# Patient Record
Sex: MALE | Race: WHITE | NOT HISPANIC OR LATINO | Employment: FULL TIME | URBAN - NONMETROPOLITAN AREA
[De-identification: names, ages, dates, MRNs, and addresses within clinical notes are randomized per-mention and may not be internally consistent; named-entity substitution may affect disease eponyms.]

---

## 2020-07-07 ENCOUNTER — APPOINTMENT (EMERGENCY)
Dept: RADIOLOGY | Facility: HOSPITAL | Age: 30
End: 2020-07-07
Payer: COMMERCIAL

## 2020-07-07 ENCOUNTER — HOSPITAL ENCOUNTER (EMERGENCY)
Facility: HOSPITAL | Age: 30
Discharge: HOME | End: 2020-07-07
Attending: EMERGENCY MEDICINE | Admitting: EMERGENCY MEDICINE
Payer: COMMERCIAL

## 2020-07-07 VITALS
OXYGEN SATURATION: 99 % | HEIGHT: 72 IN | HEART RATE: 82 BPM | TEMPERATURE: 98.5 F | RESPIRATION RATE: 16 BRPM | DIASTOLIC BLOOD PRESSURE: 66 MMHG | BODY MASS INDEX: 23.7 KG/M2 | WEIGHT: 175 LBS | SYSTOLIC BLOOD PRESSURE: 114 MMHG

## 2020-07-07 DIAGNOSIS — S69.92XA INJURY OF FINGER OF LEFT HAND, INITIAL ENCOUNTER: Primary | ICD-10-CM

## 2020-07-07 DIAGNOSIS — S61.313A LACERATION OF LEFT MIDDLE FINGER WITHOUT FOREIGN BODY WITH DAMAGE TO NAIL, INITIAL ENCOUNTER: ICD-10-CM

## 2020-07-07 DIAGNOSIS — S62.639B OPEN FRACTURE OF TUFT OF DISTAL PHALANX OF FINGER: ICD-10-CM

## 2020-07-07 PROCEDURE — 99285 EMERGENCY DEPT VISIT HI MDM: CPT | Performed by: EMERGENCY MEDICINE

## 2020-07-07 PROCEDURE — 96374 THER/PROPH/DIAG INJ IV PUSH: CPT

## 2020-07-07 PROCEDURE — 73130 X-RAY EXAM OF HAND: CPT

## 2020-07-07 PROCEDURE — 99283 EMERGENCY DEPT VISIT LOW MDM: CPT

## 2020-07-07 RX ORDER — LIDOCAINE HYDROCHLORIDE 10 MG/ML
10 INJECTION, SOLUTION EPIDURAL; INFILTRATION; INTRACAUDAL; PERINEURAL ONCE
Status: COMPLETED | OUTPATIENT
Start: 2020-07-07 | End: 2020-07-07

## 2020-07-07 RX ORDER — FENTANYL CITRATE 50 UG/ML
25 INJECTION, SOLUTION INTRAMUSCULAR; INTRAVENOUS ONCE
Status: COMPLETED | OUTPATIENT
Start: 2020-07-07 | End: 2020-07-07

## 2020-07-07 RX ORDER — NAPROXEN 375 MG/1
375 TABLET ORAL 2 TIMES DAILY WITH MEALS
Qty: 20 TABLET | Refills: 0 | Status: SHIPPED | OUTPATIENT
Start: 2020-07-07

## 2020-07-07 RX ORDER — CEPHALEXIN 500 MG/1
500 CAPSULE ORAL EVERY 12 HOURS SCHEDULED
Qty: 14 CAPSULE | Refills: 0 | Status: SHIPPED | OUTPATIENT
Start: 2020-07-07 | End: 2020-07-14

## 2020-07-07 RX ORDER — OXYCODONE HYDROCHLORIDE AND ACETAMINOPHEN 5; 325 MG/1; MG/1
1 TABLET ORAL EVERY 4 HOURS PRN
Qty: 6 TABLET | Refills: 0 | Status: SHIPPED | OUTPATIENT
Start: 2020-07-07 | End: 2020-07-17

## 2020-07-07 RX ORDER — OXYCODONE HYDROCHLORIDE AND ACETAMINOPHEN 5; 325 MG/1; MG/1
1 TABLET ORAL ONCE
Status: COMPLETED | OUTPATIENT
Start: 2020-07-07 | End: 2020-07-07

## 2020-07-07 RX ORDER — GINSENG 100 MG
1 CAPSULE ORAL ONCE
Status: COMPLETED | OUTPATIENT
Start: 2020-07-07 | End: 2020-07-07

## 2020-07-07 RX ORDER — CEPHALEXIN 250 MG/1
500 CAPSULE ORAL ONCE
Status: COMPLETED | OUTPATIENT
Start: 2020-07-07 | End: 2020-07-07

## 2020-07-07 RX ADMIN — OXYCODONE HYDROCHLORIDE AND ACETAMINOPHEN 1 TABLET: 5; 325 TABLET ORAL at 23:44

## 2020-07-07 RX ADMIN — CEPHALEXIN 500 MG: 250 CAPSULE ORAL at 22:07

## 2020-07-07 RX ADMIN — FENTANYL CITRATE 25 MCG: 50 INJECTION INTRAMUSCULAR; INTRAVENOUS at 21:31

## 2020-07-07 RX ADMIN — LIDOCAINE HYDROCHLORIDE 10 ML: 10 INJECTION, SOLUTION EPIDURAL; INFILTRATION; INTRACAUDAL; PERINEURAL at 21:33

## 2020-07-07 RX ADMIN — BACITRACIN 1 SMALL APPLICATION: 500 OINTMENT TOPICAL at 21:33

## 2020-07-08 NOTE — TELEPHONE ENCOUNTER
Ana Reynaga    [phone]    Dr Mary Ellen Mortensen    Patient was in the ED yesterday with a left hand injury  He states that they advised him he needs to be seen today 7/8  I spoke with Sissy Schwartz and she is going to reach out to Dr Mary Ellen Mortensen to have patient placed on the schedule

## 2020-07-08 NOTE — ED PROVIDER NOTES
History  Chief Complaint   Patient presents with    Finger Injury     Patient cut tip of left middle finger off while lifting  into shed     78-year-old male with a unremarkable past medical history presents with left finger laceration times 30 minutes prior to arrival   Patient states he had just finished cutting his yard when he was trying to lift the lawnmower to store it away and the blade was still running  Patient's grandfather at bedside states that usually the blade has come to a standstill before storage but for some reason this time it was still turning  Patient's left middle digit tip was immediately cut off with severe bleeding  Bleeding controlled with pressure  Patient has no other complaints  Tetanus up-to-date  No medication taken prior to arrival   Patient has no known history of bleeding disorder is not on blood thinners  Dr Shon Davis wore PPE during clinical evaluation including Bonnet, face mask, eye goggles and gloves  History provided by:  Patient and relative   used: No    Finger Laceration   Location:  Finger  Finger laceration location:  L middle finger  Length:  1cm  Depth: Through underlying tissue  Quality: jagged    Bleeding: controlled    Time since incident:  30 minutes  Laceration mechanism:  Metal edge  Pain details:     Quality:  Throbbing    Severity:  Severe    Timing:  Constant    Progression:  Unchanged  Foreign body present:  No foreign bodies  Relieved by:  Nothing  Tetanus status:  Up to date  Associated symptoms: no fever, no focal weakness, no numbness, no rash, no redness, no swelling and no streaking        None       History reviewed  No pertinent past medical history  History reviewed  No pertinent surgical history  History reviewed  No pertinent family history  I have reviewed and agree with the history as documented      E-Cigarette/Vaping    E-Cigarette Use Never User      E-Cigarette/Vaping Substances     Social History     Tobacco Use    Smoking status: Never Smoker    Smokeless tobacco: Never Used   Substance Use Topics    Alcohol use: Never     Frequency: Never    Drug use: Never       Review of Systems   Constitutional: Negative for chills, diaphoresis, fatigue and fever  HENT: Negative for congestion, drooling, facial swelling, nosebleeds, sneezing, trouble swallowing and voice change  Eyes: Negative for photophobia, pain and visual disturbance  Respiratory: Negative for cough, chest tightness, shortness of breath and wheezing  Cardiovascular: Negative for chest pain, palpitations and leg swelling  Gastrointestinal: Negative for abdominal pain, constipation, diarrhea, nausea and vomiting  Genitourinary: Negative for decreased urine volume, difficulty urinating, discharge, dysuria, flank pain, frequency, hematuria, penile pain, penile swelling, scrotal swelling, testicular pain and urgency  Musculoskeletal: Negative for arthralgias, back pain, myalgias, neck pain and neck stiffness  Skin: Positive for wound  Negative for color change, pallor and rash  Allergic/Immunologic: Negative for immunocompromised state  Neurological: Negative for dizziness, tremors, focal weakness, seizures, syncope, facial asymmetry, speech difficulty, weakness, light-headedness, numbness and headaches  Hematological: Negative for adenopathy  Psychiatric/Behavioral: Negative for agitation, confusion, hallucinations and suicidal ideas  The patient is not nervous/anxious  Physical Exam  Physical Exam   Constitutional: He is oriented to person, place, and time  Vital signs are normal  He appears well-developed and well-nourished  He is cooperative  Non-toxic appearance  He does not have a sickly appearance  He does not appear ill  No distress  HENT:   Head: Normocephalic and atraumatic     Right Ear: Hearing, tympanic membrane, external ear and ear canal normal    Left Ear: Hearing, tympanic membrane, external ear and ear canal normal    Nose: Nose normal  Right sinus exhibits no maxillary sinus tenderness and no frontal sinus tenderness  Left sinus exhibits no maxillary sinus tenderness and no frontal sinus tenderness  Mouth/Throat: Uvula is midline, oropharynx is clear and moist and mucous membranes are normal  No oropharyngeal exudate, posterior oropharyngeal edema, posterior oropharyngeal erythema or tonsillar abscesses  Eyes: Pupils are equal, round, and reactive to light  Conjunctivae, EOM and lids are normal    Neck: Trachea normal, normal range of motion, full passive range of motion without pain and phonation normal  Neck supple  No thyroid mass and no thyromegaly present  Cardiovascular: Normal rate, regular rhythm, S1 normal, S2 normal, normal heart sounds, intact distal pulses and normal pulses  Pulses:       Radial pulses are 2+ on the right side, and 2+ on the left side  Dorsalis pedis pulses are 2+ on the right side, and 2+ on the left side  Pulmonary/Chest: Effort normal and breath sounds normal  No accessory muscle usage or stridor  No tachypnea  No respiratory distress  He has no decreased breath sounds  He has no wheezes  He has no rhonchi  He has no rales  He exhibits no mass, no tenderness, no deformity and no retraction  Abdominal: Soft  Bowel sounds are normal  He exhibits no distension, no ascites and no mass  There is no hepatosplenomegaly  There is no tenderness  There is no rigidity, no rebound, no guarding, no CVA tenderness, no tenderness at McBurney's point and negative García's sign  No hernia  Genitourinary: Penis normal    Musculoskeletal: Normal range of motion  He exhibits tenderness and deformity  He exhibits no edema  Hands:  Tip of 3rd distal phalanx of left hand has been removed from lawnmower blade, bleeding controlled with pressure; proximal fingernail in place; no foreign objects observed    Left upper extremity is neurovascularly intact with full sensation and motor strength; +2 radial pulse; good cap refill less than 2 seconds   Lymphadenopathy:     He has no cervical adenopathy  Neurological: He is alert and oriented to person, place, and time  He has normal strength  He is not disoriented  He displays no atrophy and no tremor  No cranial nerve deficit or sensory deficit  He exhibits normal muscle tone  He displays a negative Romberg sign  He displays no seizure activity  Coordination and gait normal  GCS eye subscore is 4  GCS verbal subscore is 5  GCS motor subscore is 6  Patient is AAOx4, GCS 15; speaking clearly and appropriately; motor and sensation intact; visual fields intact; cranial nerves II-XII grossly intact; no facial droop, slurred speech or arm drift   Skin: Skin is warm and dry  Capillary refill takes less than 2 seconds  Laceration noted  No abrasion, no bruising, no burn, no ecchymosis, no lesion, no petechiae and no rash noted  Rash is not maculopapular  He is not diaphoretic  No erythema  No pallor  Psychiatric: He has a normal mood and affect  His speech is normal and behavior is normal  Judgment and thought content normal  He is not actively hallucinating  Cognition and memory are normal  He is attentive  Nursing note and vitals reviewed        Vital Signs  ED Triage Vitals [07/07/20 2116]   Temperature Pulse Respirations Blood Pressure SpO2   98 °F (36 7 °C) 104 20 118/78 98 %      Temp Source Heart Rate Source Patient Position - Orthostatic VS BP Location FiO2 (%)   Temporal Monitor Sitting Right arm --      Pain Score       Worst Possible Pain           Vitals:    07/07/20 2116 07/07/20 2346   BP: 118/78 114/66   Pulse: 104 82   Patient Position - Orthostatic VS: Sitting Sitting         Visual Acuity      ED Medications  Medications   fentanyl citrate (PF) 100 MCG/2ML 25 mcg (25 mcg Intravenous Given 7/7/20 2131)   lidocaine (PF) (XYLOCAINE-MPF) 1 % injection 10 mL (10 mL Infiltration Given 7/7/20 2133)   bacitracin topical ointment 1 small application (1 small application Topical Given 7/7/20 2133)   cephalexin (KEFLEX) capsule 500 mg (500 mg Oral Given 7/7/20 2207)   oxyCODONE-acetaminophen (PERCOCET) 5-325 mg per tablet 1 tablet (1 tablet Oral Given 7/7/20 2344)       Diagnostic Studies  Results Reviewed     None                 XR hand 3+ views LEFT   Final Result by Bo Skiff, MD (07/07 2202)      Comminuted nondisplaced fracture of the third distal phalanx [facility]  Workstation performed: [id number]                    Procedures  Digital Block  Performed by: Nilay Contreras MD  Authorized by: Nilay Contreras MD     Consent:     Consent obtained:  Verbal    Consent given by:  Patient    Risks discussed: Allergic reaction, infection, nerve damage, swelling, unsuccessful block, pain, intravascular injection and bleeding    Alternatives discussed:  No treatment and alternative treatment  Indications:     Indications:  Pain relief  Location:     Block location:  Finger    Finger blocked:  L long finger  Pre-procedure details:     Neurovascular status: intact      Skin preparation:  2% chlorhexidine and alcohol  Procedure details (see MAR for exact dosages):     Syringe type:  Luer lock syringe    Needle gauge:  25 G    Anesthetic injected:  Lidocaine 1% w/o epi    Technique:  Four-sided ring block    Injection procedure:  Anatomic landmarks palpated, anatomic landmarks identified and negative aspiration for blood  Post-procedure details:     Outcome:  Anesthesia achieved    Patient tolerance of procedure: Tolerated well, no immediate complications  Comments:      Digital block prior to thorough cleansing of wound/fingertip amputation  Splint application  Date/Time: 7/7/2020 10:53 PM  Performed by: Nilay Contreras MD  Authorized by:  Nilay Contreras MD     Patient location:  Bedside  Performing Provider:  Attending  Consent:     Consent obtained:  Verbal    Consent given by:  Patient    Risks discussed: Discoloration, numbness, pain and swelling    Alternatives discussed:  No treatment  Universal protocol:     Procedure explained and questions answered to patient or proxy's satisfaction: yes      Patient identity confirmed:  Verbally with patient  Indication:     Indications: other medical problem (comment)    Pre-procedure details:     Sensation:  Normal    Skin color:  Pink, <2 secons cap refill  Procedure details:     Laterality:  Left    Location:  Finger    Finger:  L long finger    Strapping: no      Supplies:  Prefabricated splint  Post-procedure details:     Pain:  Improved    Sensation:  Normal    Neurovascular Exam: skin pink, capillary refill <2 sec, normal pulses and skin intact, warm, and dry      Patient tolerance of procedure: Tolerated well, no immediate complications  Comments:      Left 3rd digit clamshell pre-made splint for distal 3rd phalanx digit comminuted tuft fracture             ED Course  ED Course as of Jul 11 0733 Tue Jul 07, 2020 2148 Texted Dr Jumana Ribera, Hand Surgery about patient  He agrees with digital block, irrigate, debride, suture if possible or do nail removal  Keflex for seven days  Vaseline gauze, bulky dressing, He can see patient tomorrow  2151 Discussed digital block with patient and reviewed risks and benefits including pain, infection, nerve damage, unsuccessful pain control  Patient verbally agrees to digital block which is required so that finger tip amputation can before thoroughly cleaned  ED tech assisted with cleansing which included use of 250 cc of sterile saline  Bacitracin was applied then Vaseline gauze then clamshell splint then bulky dressing  First dose of Keflex provided in ED       2325 Left hand xray:IMPRESSION:     Comminuted nondisplaced fracture of the third distal phalanx tuft          2341 Reassessed patient  He has no complaints at this time    Left 3rd digit has been dressed and patient is ready for discharge to home with PCP follow-up in Hand surgery referral   Patient provided scripts for pain control and antibiotic  Strict return to ER precautions discussed with and acknowledged by patient  Work note provided  Grandfather at bedside will drive him home  US AUDIT      Most Recent Value   Initial Alcohol Screen: US AUDIT-C    1  How often do you have a drink containing alcohol?  0 Filed at: 07/07/2020 2115   2  How many drinks containing alcohol do you have on a typical day you are drinking? 0 Filed at: 07/07/2020 2115   3a  Male UNDER 65: How often do you have five or more drinks on one occasion? 0 Filed at: 07/07/2020 2115   3b  FEMALE Any Age, or MALE 65+: How often do you have 4 or more drinks on one occassion? 0 Filed at: 07/07/2020 2115   Audit-C Score  0 Filed at: 07/07/2020 2115                  ZIA/DAST-10      Most Recent Value   How many times in the past year have you    Used an illegal drug or used a prescription medication for non-medical reasons?   Never Filed at: 07/07/2020 2115          MDM  Number of Diagnoses or Management Options  Injury of finger of left hand, initial encounter:   Laceration of left middle finger without foreign body with damage to nail, initial encounter:   Open fracture of tuft of distal phalanx of finger:      Amount and/or Complexity of Data Reviewed  Clinical lab tests: reviewed and ordered  Tests in the radiology section of CPT®: reviewed and ordered  Tests in the medicine section of CPT®: ordered and reviewed  Review and summarize past medical records: yes  Discuss the patient with other providers: yes (Hand Surgery, Dr Emma Cheng)  Independent visualization of images, tracings, or specimens: yes (Left hand x-ray)    Risk of Complications, Morbidity, and/or Mortality  Presenting problems: moderate  Diagnostic procedures: moderate  Management options: moderate    Patient Progress  Patient progress: stable        Disposition  Final diagnoses:   Injury of finger of left hand, initial encounter   Open fracture of tuft of distal phalanx of finger   Laceration of left middle finger without foreign body with damage to nail, initial encounter     Time reflects when diagnosis was documented in both MDM as applicable and the Disposition within this note     Time User Action Codes Description Comment    7/7/2020 11:25 PM Mearl Kath Add [S96 [id number]] Injury of finger of left hand, initial encounter     7/7/2020 11:26 PM Mearl Kath Add [S62 [id number]] Open fracture of tuft of distal phalanx of finger     7/7/2020 11:28 PM Mearl Kath Add [S61 [id number]] Laceration of left middle finger without foreign body with damage to nail, initial encounter       ED Disposition     ED Disposition Condition Date/Time Comment    Discharge Stable Tue Jul 7, 2020 11:25  [address] discharge to home/self care  Follow-up Information     Follow up With Specialties Details Why Contact Info    Gita Modi MD Family Medicine Call in 1 day As needed, If symptoms worsen Smith Hailema DO [facility], Hand Surgery Go on 7/8/2020 Go to Hand Surgeon July 8th to have your left hand laceration and fracture to be evaluated   [address]  [phone]            Discharge Medication List as of 7/7/2020 11:30 PM      START taking these medications    Details   cephalexin (KEFLEX) 500 mg capsule Take 1 capsule (500 mg total) by mouth every 12 (twelve) hours for 7 days, Starting Tue 7/7/2020, Until Tue 7/14/2020, Print      naproxen (NAPROSYN) 375 mg tablet Take 1 tablet (375 mg total) by mouth 2 (two) times a day with meals, Starting Tue 7/7/2020, Print      oxyCODONE-acetaminophen (PERCOCET) 5-325 mg per tablet Take 1 tablet by mouth every 4 (four) hours as needed for moderate pain for up to 10 daysMax Daily Amount: 6 tablets, Starting Tue 7/7/2020, Until Fri 7/17/2020, Normal           No discharge procedures on file      PDMP Review     None          ED Provider  Electronically Signed by    MD Jony Bains MD  07/11/20 9944

## 2020-07-08 NOTE — TELEPHONE ENCOUNTER
Pt  Called in to be seen by a hand surgeon today as advised from the ED  I reached out to Dr Linda Galvan who reviewed pt  ED encounter and stated pt  Can be placed on his schedule for tomorrow  And to advised pt  To continue taking antibiotics   I reached out to the pt  And advised him   I will call him back with the time and place of appt  After reaching out to Holley Dunbar to have pt   Placed on schedule

## 2020-07-08 NOTE — TELEPHONE ENCOUNTER
Gabriel Carter called back to see if he was scheduled   I advised I will call him back before I leave with an appt

## 2020-07-09 ENCOUNTER — TELEPHONE (OUTPATIENT)
Dept: OBGYN CLINIC | Facility: CLINIC | Age: 30
End: 2020-07-09

## 2020-07-09 ENCOUNTER — OFFICE VISIT (OUTPATIENT)
Dept: OBGYN CLINIC | Facility: MEDICAL CENTER | Age: 30
End: 2020-07-09
Payer: COMMERCIAL

## 2020-07-09 VITALS
WEIGHT: 175 LBS | HEIGHT: 72 IN | TEMPERATURE: 98.7 F | DIASTOLIC BLOOD PRESSURE: 77 MMHG | SYSTOLIC BLOOD PRESSURE: 118 MMHG | HEART RATE: 79 BPM | BODY MASS INDEX: 23.7 KG/M2

## 2020-07-09 DIAGNOSIS — S62.663A CLOSED NONDISPLACED FRACTURE OF DISTAL PHALANX OF LEFT MIDDLE FINGER, INITIAL ENCOUNTER: Primary | ICD-10-CM

## 2020-07-09 DIAGNOSIS — S61.313A LACERATION OF LEFT MIDDLE FINGER WITHOUT FOREIGN BODY WITH DAMAGE TO NAIL, INITIAL ENCOUNTER: ICD-10-CM

## 2020-07-09 PROCEDURE — 99204 OFFICE O/P NEW MOD 45 MIN: CPT | Performed by: ORTHOPAEDIC SURGERY

## 2020-07-09 NOTE — TELEPHONE ENCOUNTER
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

## 2020-07-09 NOTE — LETTER
July 9, 2020     Patient: Bridgette Mann   YOB: 1990   Date of Visit: 7/9/2020       To Whom it May Concern:    Marty Clark is under my professional care  He was seen in my office on 7/9/2020  He may return to work with limitations of no use of left hand until next follow up evaluation in 2 weeks       If you have any questions or concerns, please don't hesitate to call           Sincerely,          Krish Silav MD        CC: No Recipients

## 2020-07-09 NOTE — PROGRESS NOTES
Chief Complaint     Left Middle Finger      History of Present Illness     Keyshawn braga a 26 y o  male presents today for a left middle finger tuft fracture with laceration sustained on 7/7/2020 after lifting   He was seen in the ED that same day who took x rays and referred him to Orthopedics for further evaluation and treatment  He states that he has been taking Keflex 4x daily as prescribed  He is noting a "sharp, shooting" pain about the distal aspect of his middle finger  He is able to move the digit  Denies any previous injury to the finger  Denies fevers or chills  History reviewed  No pertinent past medical history  History reviewed  No pertinent surgical history  No Known Allergies    Current Outpatient Medications on File Prior to Visit   Medication Sig Dispense Refill    cephalexin (KEFLEX) 500 mg capsule Take 1 capsule (500 mg total) by mouth every 12 (twelve) hours for 7 days 14 capsule 0    naproxen (NAPROSYN) 375 mg tablet Take 1 tablet (375 mg total) by mouth 2 (two) times a day with meals 20 tablet 0    oxyCODONE-acetaminophen (PERCOCET) 5-325 mg per tablet Take 1 tablet by mouth every 4 (four) hours as needed for moderate pain for up to 10 daysMax Daily Amount: 6 tablets 6 tablet 0     No current facility-administered medications on file prior to visit  Social History     Tobacco Use    Smoking status: Never Smoker    Smokeless tobacco: Never Used   Substance Use Topics    Alcohol use: Never     Frequency: Never    Drug use: Never       History reviewed  No pertinent family history  Review of Systems     As stated in the HPI  All other systems were reviewed and are negative  Physical Exam     /77   Pulse 79   Temp 98 7 °F (37 1 °C)   Ht 6' (1 829 m)   Wt 79 4 kg (175 lb)   BMI 23 73 kg/m²     GENERAL: This is a well-developed, well-nourished, age-appropriate patient in no acute distress  The patient is alert and oriented x3   Pleasant and cooperative  Eyes: Anicteric sclerae  Extraocular movements appear intact  HENT: Nares are patent with no drainage  Lungs: There is equal chest rise on inspection  Breathing is non-labored with no audible wheezing  Cardiovascular: No cyanosis  No upper extremity lymphadema  Skin: Skin is warm to touch  No obvious skin lesions or rashes other than described below  Neurologic: No ataxia  Psychiatric: Mood and affect are appropriate  Left Middle Finger: No purulence or drainage  No erythema  Laceration distal phalanx tip without signs of infection  Slight fracture through the distal 3rd of the nail plate  Underlying sterile matrix is abraded  Proximal aspect of the nail including the eponychial fold is intact  Finger motion intact  5/5 Motor to the APB, FDI, FDP2, FDP5, EDC  Sensation intact to light touch in the median, radial, and ulnar nerve distribution  Brisk capillary refill  Data Review     Results Reviewed     None             Imaging:  3 views of the left hand performed on 7/7/2020 were reviewed in the office today by myself and demonstrate Comminuted fracture of the distal phalanx tuft  Assessment and Plan      Diagnoses and all orders for this visit:    Closed nondisplaced fracture of distal phalanx of left middle finger, initial encounter    Laceration of left middle finger without foreign body with damage to nail, initial encounter           25 y/o male with a non displaced distal phalanx, tuft fracture with laceration of the left middle finger sustained on 7/7/2020  Patient was instructed to wash and soak finger once daily in warm water and soap  Then apply neosporin, gauze and wrap with Coban as shown today in the office  Finish Keflex as prescribed  Informed patient about signs of infection  He understood and all questions were answered  Patient may return to work but with no use of the left hand until next follow up visit in 2 weeks  Note was provided today   He works as an HVAC repairman         Follow Up: 2 weeks    To Do Next Visit: Re evaluation of current issue    PROCEDURES PERFORMED:  Procedures  No Procedures performed today    Scribe Attestation    I,:   Janet Goodrich am acting as a scribe while in the presence of the attending physician :        I,:   Michell Maynard MD personally performed the services described in this documentation    as scribed in my presence :

## 2020-07-15 NOTE — TELEPHONE ENCOUNTER
I have to legally write what he is capable of doing for work  If he cannot perform his job one handed (which obviously seems impossible) and his company doesn't have a form of light duty, he will need to be out

## 2020-07-23 ENCOUNTER — OFFICE VISIT (OUTPATIENT)
Dept: OBGYN CLINIC | Facility: MEDICAL CENTER | Age: 30
End: 2020-07-23
Payer: COMMERCIAL

## 2020-07-23 VITALS
HEIGHT: 72 IN | WEIGHT: 175 LBS | SYSTOLIC BLOOD PRESSURE: 119 MMHG | TEMPERATURE: 99.1 F | HEART RATE: 73 BPM | DIASTOLIC BLOOD PRESSURE: 84 MMHG | BODY MASS INDEX: 23.7 KG/M2

## 2020-07-23 DIAGNOSIS — S61.313A LACERATION OF LEFT MIDDLE FINGER WITHOUT FOREIGN BODY WITH DAMAGE TO NAIL, INITIAL ENCOUNTER: Primary | ICD-10-CM

## 2020-07-23 DIAGNOSIS — S62.663A CLOSED NONDISPLACED FRACTURE OF DISTAL PHALANX OF LEFT MIDDLE FINGER, INITIAL ENCOUNTER: ICD-10-CM

## 2020-07-23 PROCEDURE — 99213 OFFICE O/P EST LOW 20 MIN: CPT | Performed by: ORTHOPAEDIC SURGERY

## 2020-07-23 NOTE — LETTER
July 23, 2020     Patient: Sree Philippe   YOB: 1990   Date of Visit: 7/23/2020       To Whom it May Concern:    Andrew Corley is under my professional care  He was seen in my office on 7/23/2020  He can return to work in a limited capacity with the left hand as of 7/28/2020  This means he can perform diagnositcs, light service repairs, but should not lift over 5 lbs and cannot crawl  He will be reassessed in 2 weeks following this appointment for return to full duty  If you have any questions or concerns, please don't hesitate to call           Sincerely,          Jennifer Goss MD        CC: No Recipients

## 2020-07-23 NOTE — PROGRESS NOTES
Chief Complaint     Left middle finger pain      History of Present Illness     Josr Perez is a 26 y o  male status post injury to his left middle finger  Patient has been wrapping every day with Vaseline and gauze and Coban  Notes minimal pain  Notes that there is a hyper granular area that may obstruct his nail growth with new skin  Does not have much numbness and tingling either  Is not back to work yet  Has finished his antibiotic          History reviewed  No pertinent past medical history  History reviewed  No pertinent surgical history  No Known Allergies    Current Outpatient Medications on File Prior to Visit   Medication Sig Dispense Refill    naproxen (NAPROSYN) 375 mg tablet Take 1 tablet (375 mg total) by mouth 2 (two) times a day with meals (Patient not taking: Reported on 7/23/2020) 20 tablet 0     No current facility-administered medications on file prior to visit  Social History     Tobacco Use    Smoking status: Never Smoker    Smokeless tobacco: Never Used   Substance Use Topics    Alcohol use: Never     Frequency: Never    Drug use: Never       History reviewed  No pertinent family history  Review of Systems     As stated in the HPI  All other systems were reviewed and are negative  Physical Exam     /84   Pulse 73   Temp 99 1 °F (37 3 °C)   Ht 6' (1 829 m)   Wt 79 4 kg (175 lb)   BMI 23 73 kg/m²     GENERAL: This is a well-developed, well-nourished, age-appropriate patient in no acute distress  The patient is alert and oriented x3  Pleasant and cooperative  Eyes: Anicteric sclerae  Extraocular movements appear intact  HENT: Nares are patent with no drainage  Lungs: There is equal chest rise on inspection  Breathing is non-labored with no audible wheezing  Cardiovascular: No cyanosis  No upper extremity lymphadema  Skin: Skin is warm to touch  No obvious skin lesions or rashes other than described below    Neurologic: No ataxia  Psychiatric: Mood and affect are appropriate  Examination left upper extremity reveals hypergranulation tissue at the middle finger finger tip  Proximal nail plate is intact  No bone exposed  Excellent granulation tissue to the distal nail bed and tip of the finger  Minimal tenderness to palpation  Able to flex and extend at the DIP joint  Brisk capillary refill  Data Review     Results Reviewed     None             Imaging:  None today    Assessment and Plan      Diagnoses and all orders for this visit:    Laceration of left middle finger without foreign body with damage to nail, initial encounter    Closed nondisplaced fracture of distal phalanx of left middle finger, initial encounter           26-year-old male with a healing middle finger laceration at the tip as well as tuft fracture  Patient will continue Neosporin and Coban wrapping with no gauze  I instructed him how to depress the hypergranulation tissue with gentle compression in a specific manner using the Coban wrapping  He will perform this on a daily basis  I also discussed watching up for infection signs and he will continue do this  He will return to work in a limited basis which was put into a work note today  He will be seen back in 2 weeks for repeat evaluation potential clearance back to work        Follow Up:  2 weeks    To Do Next Visit:     PROCEDURES PERFORMED:  Procedures  No Procedures performed today

## 2020-08-06 ENCOUNTER — OFFICE VISIT (OUTPATIENT)
Dept: OBGYN CLINIC | Facility: MEDICAL CENTER | Age: 30
End: 2020-08-06
Payer: COMMERCIAL

## 2020-08-06 VITALS
WEIGHT: 170 LBS | DIASTOLIC BLOOD PRESSURE: 73 MMHG | SYSTOLIC BLOOD PRESSURE: 132 MMHG | BODY MASS INDEX: 23.03 KG/M2 | HEART RATE: 68 BPM | HEIGHT: 72 IN | TEMPERATURE: 99.9 F

## 2020-08-06 DIAGNOSIS — S62.639B OPEN FRACTURE OF TUFT OF DISTAL PHALANX OF FINGER: Primary | ICD-10-CM

## 2020-08-06 PROCEDURE — 99213 OFFICE O/P EST LOW 20 MIN: CPT | Performed by: ORTHOPAEDIC SURGERY

## 2020-08-06 NOTE — LETTER
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

## 2020-08-06 NOTE — PROGRESS NOTES
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 as per last note  Patient has noticed recently that the nail has been going into the lateral Full  He notes he has been keeping the nail short occurs  He denies any new injury  He denies any signs of infection  He is overall very happy with the appearance of the finger today  He does feel like he will need additional 2 weeks before returning to his full duty job  History reviewed  No pertinent past medical history  History reviewed  No pertinent surgical history  No Known Allergies    Current Outpatient Medications on File Prior to Visit   Medication Sig Dispense Refill    naproxen (NAPROSYN) 375 mg tablet Take 1 tablet (375 mg total) by mouth 2 (two) times a day with meals (Patient not taking: Reported on 7/23/2020) 20 tablet 0     No current facility-administered medications on file prior to visit  Social History     Tobacco Use    Smoking status: Never Smoker    Smokeless tobacco: Never Used   Substance Use Topics    Alcohol use: Never     Frequency: Never    Drug use: Never       History reviewed  No pertinent family history  Review of Systems     As stated in the HPI  All other systems were reviewed and are negative  Physical Exam     /73   Pulse 68   Temp 99 9 °F (37 7 °C)   Ht 6' (1 829 m)   Wt 77 1 kg (170 lb)   BMI 23 06 kg/m²     GENERAL: This is a well-developed, well-nourished, age-appropriate patient in no acute distress   The patient is alert and oriented x3  Pleasant and cooperative  Eyes: Anicteric sclerae  Extraocular movements appear intact  HENT: Nares are patent with no drainage  Lungs: There is equal chest rise on inspection  Breathing is non-labored with no audible wheezing  Cardiovascular: No cyanosis  No upper extremity lymphadema  Skin: Skin is warm to touch  No obvious skin lesions or rashes other than described below  Neurologic: No ataxia  Psychiatric: Mood and affect are appropriate  Left long finger  No drainage or erythema noted  Healthy granulation tissue noted  Skin is rounding out nicely  Nail slightly hooking into the lateral nail fold   Finger motion is intact  Sensation intact to the ulnar radial aspect of the digit  Brisk capillary refill noted    Data Review     Results Reviewed     None             Imaging:  None today     Assessment and Plan      Diagnoses and all orders for this visit:    Open fracture of tuft of distal phalanx of finger         61-year-old male with left long finger open tuft fracture  Therefore only sees today to lift the nail to help provided to growing out of the lateral fold  Patient with instruction on how to change the reform  He should keep this under the nail for the next few days to allow the nail to grow properly  Discussed with him that he may use Vaseline to remove the Xeroform if needed  He should continue to keep the finger covered with coban  He may continue with current work restrictions for the next 2 weeks  I will see him back in 2 weeks time for re-evaluation at that time we will likely discharge him to full duty work      Follow Up: 2 weeks     To Do Next Visit:  Wound check, return to work    PROCEDURES PERFORMED:  Procedures  No Procedures performed today      Scribe Attestation    I,:   Blane Gunderson MA am acting as a scribe while in the presence of the attending physician :        I,:   Marbin Tolliver MD personally performed the services described in this documentation    as scribed in my presence :

## 2020-08-20 ENCOUNTER — OFFICE VISIT (OUTPATIENT)
Dept: OBGYN CLINIC | Facility: MEDICAL CENTER | Age: 30
End: 2020-08-20
Payer: COMMERCIAL

## 2020-08-20 VITALS
BODY MASS INDEX: 23.03 KG/M2 | DIASTOLIC BLOOD PRESSURE: 83 MMHG | HEART RATE: 89 BPM | SYSTOLIC BLOOD PRESSURE: 134 MMHG | WEIGHT: 170 LBS | TEMPERATURE: 99.8 F | HEIGHT: 72 IN

## 2020-08-20 DIAGNOSIS — S62.639B OPEN FRACTURE OF TUFT OF DISTAL PHALANX OF FINGER: Primary | ICD-10-CM

## 2020-08-20 PROCEDURE — 99213 OFFICE O/P EST LOW 20 MIN: CPT | Performed by: ORTHOPAEDIC SURGERY

## 2023-08-18 ENCOUNTER — OFFICE VISIT (OUTPATIENT)
Dept: URGENT CARE | Facility: CLINIC | Age: 33
End: 2023-08-18
Payer: COMMERCIAL

## 2023-08-18 ENCOUNTER — APPOINTMENT (OUTPATIENT)
Dept: RADIOLOGY | Facility: CLINIC | Age: 33
End: 2023-08-18
Payer: COMMERCIAL

## 2023-08-18 VITALS
OXYGEN SATURATION: 99 % | DIASTOLIC BLOOD PRESSURE: 83 MMHG | BODY MASS INDEX: 23.98 KG/M2 | TEMPERATURE: 97.4 F | HEART RATE: 88 BPM | SYSTOLIC BLOOD PRESSURE: 144 MMHG | WEIGHT: 177 LBS | RESPIRATION RATE: 18 BRPM | HEIGHT: 72 IN

## 2023-08-18 DIAGNOSIS — S52.291A OTHER CLOSED FRACTURE OF SHAFT OF RIGHT ULNA, INITIAL ENCOUNTER: Primary | ICD-10-CM

## 2023-08-18 DIAGNOSIS — K04.7 DENTAL INFECTION: ICD-10-CM

## 2023-08-18 DIAGNOSIS — S49.91XA INJURY OF RIGHT UPPER EXTREMITY, INITIAL ENCOUNTER: ICD-10-CM

## 2023-08-18 PROCEDURE — G0382 LEV 3 HOSP TYPE B ED VISIT: HCPCS | Performed by: STUDENT IN AN ORGANIZED HEALTH CARE EDUCATION/TRAINING PROGRAM

## 2023-08-18 PROCEDURE — 73090 X-RAY EXAM OF FOREARM: CPT

## 2023-08-18 PROCEDURE — 99283 EMERGENCY DEPT VISIT LOW MDM: CPT | Performed by: STUDENT IN AN ORGANIZED HEALTH CARE EDUCATION/TRAINING PROGRAM

## 2023-08-18 RX ORDER — AMOXICILLIN AND CLAVULANATE POTASSIUM 875; 125 MG/1; MG/1
1 TABLET, FILM COATED ORAL EVERY 12 HOURS SCHEDULED
Qty: 20 TABLET | Refills: 0 | Status: SHIPPED | OUTPATIENT
Start: 2023-08-18 | End: 2023-08-28

## 2023-08-18 NOTE — PROGRESS NOTES
[facility]        NAME: Juliann Patterson is a 26 y.o. male  : 1990    MRN: [id number]  DATE: 2023  TIME: 5:24 PM    Assessment and Orders   Other closed fracture of shaft of right ulna, initial encounter [S52.291A]  1. Other closed fracture of shaft of right ulna, initial encounter        2. Injury of right upper extremity, initial encounter  XR forearm 2 vw right    Ambulatory Referral to Orthopedic Surgery      3. Dental infection  amoxicillin-clavulanate (AUGMENTIN) 875-125 mg per tablet            Plan and Discussion      Xray reading-"Acute nondisplaced ulnar diaphyseal fracture at the proximal aspect of the existing hardware."    Patient placed in long-arm splint and referred to orthopedics. In regards to patient's dental infection, will prescribe Augmentin x10 days. Patient understands he is to follow-up with dentist ASAP. Discussed ED precautions including (but not limited to)  • Difficultly breathing or shortness of breath  • Chest pain  • Acutely worsening symptoms. Risks and benefits discussed. Patient understands and agrees with the plan. Follow up with PCP. Chief Complaint     Chief Complaint   Patient presents with   • Arm Pain     Pt reports falling onto pavement on , pt used R forearm to brace fall and since incident is having pain with moving that R arm. History of Present Illness       HPI    Patient is a 20-year-old male who presents with 2 separate complaints today. First complaint is that he fell on his right forearm 2 days ago. .  Patient has history of trauma to this forearm a few years ago and has 2 plates in place. 1 plate is in the radius and 1 plate is in the ulna. He states that he normally has restricted range of motion but after his fall he is unable to supinate and pronate his forearm without pain. No numbness or tingling. Patient also has dental pain in his right lower jaw.   Patient states he has a significant cavity there however he does not have dental insurance and has been unable to get into the dentist.  For the last few days he has had increasing pain and swelling to the area. No purulent discharge. Area is extremely thermal sensitive. No fevers. No difficulty swallowing. Review of Systems   Review of Systems  Stated in HPI    Current Medications       Current Outpatient Medications:   •  amoxicillin-clavulanate (AUGMENTIN) 875-125 mg per tablet, Take 1 tablet by mouth every 12 (twelve) hours for 10 days, Disp: 20 tablet, Rfl: 0  •  naproxen (NAPROSYN) 375 mg tablet, Take 1 tablet (375 mg total) by mouth 2 (two) times a day with meals (Patient not taking: Reported on 8/20/2020), Disp: 20 tablet, Rfl: 0    Current Allergies     Allergies as of 08/18/2023   • (No Known Allergies)            The following portions of the patient's history were reviewed and updated as appropriate: allergies, current medications, past family history, past medical history, past social history, past surgical history and problem list.     History reviewed. No pertinent past medical history. Past Surgical History:   Procedure Laterality Date   • KNEE SURGERY Left    • ORIF RADIUS & ULNA FRACTURES Right    • SHOULDER SURGERY Right        History reviewed. No pertinent family history. Medications have been verified. Objective   /83   Pulse 88   Temp (!) 97.4 °F (36.3 °C)   Resp 18   Ht 6' (1.829 m)   Wt 80.3 kg (177 lb)   SpO2 99%   BMI 24.01 kg/m²   No LMP for male patient. Physical Exam     Physical Exam  Constitutional:       General: He is not in acute distress. Appearance: He is normal weight. HENT:      Mouth/Throat:     Pulmonary:      Effort: No respiratory distress. Musculoskeletal:      Right forearm: Edema, laceration (Lacerations are superficial), tenderness and bony tenderness (Along ulna shaft) present. No swelling or deformity.    Neurological:      General: No focal deficit present. Mental Status: He is alert and oriented to person, place, and time.    Psychiatric:         Mood and Affect: Mood normal.         Behavior: Behavior normal.                 Ángel South DO     Orthopedic injury treatment    Date/Time: 8/18/2023 2:30 PM    Performed by: Miguel Lowery DO  Authorized by: Miguel Lowery DO    Patient Location:  [facility]  [place] Protocol:  Consent given by: patient  Patient understanding: patient states understanding of the procedure being performed  Patient identity confirmed: verbally with patient      Injury location:  Forearm  Location details:  Right forearm  Injury type:  Fracture  Fracture type: ulnar shaft    Neurovascular status: Neurovascularly intact    Distal perfusion: normal    Neurological function: normal    Range of motion: reduced    Immobilization:  Splint  Splint type:  Long arm  Neurovascular status: Neurovascularly intact    Distal perfusion: normal    Neurological function: normal    Range of motion: unchanged